# Patient Record
Sex: MALE | Race: OTHER | HISPANIC OR LATINO | URBAN - METROPOLITAN AREA
[De-identification: names, ages, dates, MRNs, and addresses within clinical notes are randomized per-mention and may not be internally consistent; named-entity substitution may affect disease eponyms.]

---

## 2023-10-26 ENCOUNTER — EMERGENCY (EMERGENCY)
Facility: HOSPITAL | Age: 53
LOS: 1 days | Discharge: HOME | End: 2023-10-26
Attending: EMERGENCY MEDICINE
Payer: COMMERCIAL

## 2023-10-26 VITALS
HEIGHT: 67 IN | SYSTOLIC BLOOD PRESSURE: 128 MMHG | TEMPERATURE: 98 F | HEART RATE: 78 BPM | OXYGEN SATURATION: 99 % | WEIGHT: 141.1 LBS | DIASTOLIC BLOOD PRESSURE: 87 MMHG | RESPIRATION RATE: 18 BRPM

## 2023-10-26 LAB
APPEARANCE UR: CLEAR — SIGNIFICANT CHANGE UP
APPEARANCE UR: CLEAR — SIGNIFICANT CHANGE UP
BILIRUB UR-MCNC: NEGATIVE — SIGNIFICANT CHANGE UP
BILIRUB UR-MCNC: NEGATIVE — SIGNIFICANT CHANGE UP
COLOR SPEC: YELLOW — SIGNIFICANT CHANGE UP
COLOR SPEC: YELLOW — SIGNIFICANT CHANGE UP
DIFF PNL FLD: ABNORMAL
DIFF PNL FLD: ABNORMAL
GLUCOSE UR QL: NEGATIVE MG/DL — SIGNIFICANT CHANGE UP
GLUCOSE UR QL: NEGATIVE MG/DL — SIGNIFICANT CHANGE UP
KETONES UR-MCNC: NEGATIVE — SIGNIFICANT CHANGE UP
KETONES UR-MCNC: NEGATIVE — SIGNIFICANT CHANGE UP
LEUKOCYTE ESTERASE UR-ACNC: NEGATIVE — SIGNIFICANT CHANGE UP
LEUKOCYTE ESTERASE UR-ACNC: NEGATIVE — SIGNIFICANT CHANGE UP
NITRITE UR-MCNC: NEGATIVE — SIGNIFICANT CHANGE UP
NITRITE UR-MCNC: NEGATIVE — SIGNIFICANT CHANGE UP
PH UR: 6.5 — SIGNIFICANT CHANGE UP (ref 5–8)
PH UR: 6.5 — SIGNIFICANT CHANGE UP (ref 5–8)
PROT UR-MCNC: NEGATIVE — SIGNIFICANT CHANGE UP
PROT UR-MCNC: NEGATIVE — SIGNIFICANT CHANGE UP
RBC CASTS # UR COMP ASSIST: SIGNIFICANT CHANGE UP /HPF (ref 0–4)
RBC CASTS # UR COMP ASSIST: SIGNIFICANT CHANGE UP /HPF (ref 0–4)
SP GR SPEC: 1.01 — SIGNIFICANT CHANGE UP (ref 1.01–1.02)
SP GR SPEC: 1.01 — SIGNIFICANT CHANGE UP (ref 1.01–1.02)
UROBILINOGEN FLD QL: NEGATIVE MG/DL — SIGNIFICANT CHANGE UP
UROBILINOGEN FLD QL: NEGATIVE MG/DL — SIGNIFICANT CHANGE UP
WBC UR QL: NEGATIVE /HPF — SIGNIFICANT CHANGE UP (ref 0–5)
WBC UR QL: NEGATIVE /HPF — SIGNIFICANT CHANGE UP (ref 0–5)

## 2023-10-26 PROCEDURE — 87086 URINE CULTURE/COLONY COUNT: CPT

## 2023-10-26 PROCEDURE — 99283 EMERGENCY DEPT VISIT LOW MDM: CPT

## 2023-10-26 PROCEDURE — T1013: CPT

## 2023-10-26 PROCEDURE — 99284 EMERGENCY DEPT VISIT MOD MDM: CPT

## 2023-10-26 PROCEDURE — 81001 URINALYSIS AUTO W/SCOPE: CPT

## 2023-10-26 RX ORDER — IBUPROFEN 200 MG
600 TABLET ORAL ONCE
Refills: 0 | Status: COMPLETED | OUTPATIENT
Start: 2023-10-26 | End: 2023-10-26

## 2023-10-26 RX ADMIN — Medication 600 MILLIGRAM(S): at 20:22

## 2023-10-26 NOTE — ED STATDOCS - CARE PROVIDER_API CALL
Helder Rutledge  Urology  [address] [place] [address]  Phone: [phone]  Fax: [phone]  Follow Up Time: Routine

## 2023-10-26 NOTE — ED ADULT TRIAGE NOTE - LOCATION:
Detail Level: Detailed Quality 110: Preventive Care And Screening: Influenza Immunization: Influenza Immunization not Administered for Documented Reasons. Left arm;

## 2023-10-26 NOTE — ED ADULT TRIAGE NOTE - CHIEF COMPLAINT QUOTE
Pt A&Ox4, NAD. Pt presents to the ED with complaints of painful urination. Pt seen by urology and put on abx. Breathing even and unlabored.

## 2023-10-26 NOTE — ED STATDOCS - CLINICAL SUMMARY MEDICAL DECISION MAKING FREE TEXT BOX
Pt presents with burning at end urination on antibiotics  for 2 weeks can't recall name. Pt with no fever, abdominal pain, nausea, or vomiting. Pt has follow up with Urologist Dr. Rutledge November 17.  Will check UA, urine culture, Motrin for pain. Follow up outpatient and continue  antibiotics Pt presents with burning at end urination on antibiotics  for 2 weeks can't recall name. Pt with no fever, abdominal pain, nausea, or vomiting. Pt has follow up with Urologist Dr. Rutledge November 17.  Will check UA, urine culture, Motrin for pain. Follow up outpatient and continue  antibiotics    UA showed blood. no other wbc in urine. no signes of UTI currently. supportive care. outpatient follow up with urology.

## 2023-10-26 NOTE — ED ADULT NURSE NOTE - OBJECTIVE STATEMENT
pt c/o urning  sensation after urination. Pt states he has not been feeling well when urinating.  Pt went to Urologist Dr. Dave Rutledge and was giving antibiotics  2 weeks ago

## 2023-10-26 NOTE — ED STATDOCS - PATIENT PORTAL LINK FT
You can access the FollowMyHealth Patient Portal offered by [facility] by registering at the following website: [email or web address]. By joining [employer]’s FollowMyHealth portal, you will also be able to view your health information using other applications (apps) compatible with our system.

## 2023-10-26 NOTE — ED STATDOCS - OBJECTIVE STATEMENT
VARINDER Martinez   54 y/o male no PMHx c/o burning  sensation after urination. Pt states he has not been feeling well when urinating.  Pt went to Urologist Dr. Dave Rutledge and was giving antibiotics  2 weeks ago . Pt denies fever, nausea, and vomiting. Pt has been on antibiotics for 2 weeks ago. Pt still has antibiotics , but doesn't know the name of antibiotics. Pt November 17 follow up. Check UA

## 2023-10-26 NOTE — ED STATDOCS - NS ED ROS FT
CONSTITUTIONAL - no  fever, no diaphoresis, no weight change  SKIN - no rash  HEMATOLOGIC - no bleeding, no bruising  EYES - no eye pain, no blurred vision  ENT - no change in hearing, no pain  RESPIRATORY - no shortness of breath, no cough  CARDIAC - no chest pain, no palpitations  GI - no abd pain, no nausea, no vomiting, no diarrhea, no constipation, no bleeding  GENITO-URINARY - + burning during urination no discharge, no dysuria; no hematuria,   ENDO - no polydipsia, no polyuria, no heat/no cold intolerance  MUSCULOSKELETAL - no joint pain, no swelling, no redness  NEUROLOGIC - no weakness, no headache, no anesthesia, no paresthesias  PSYCH - no anxiety, non suicidal, non homicidal, no hallucination, no depression

## 2023-10-27 LAB
CULTURE RESULTS: SIGNIFICANT CHANGE UP
CULTURE RESULTS: SIGNIFICANT CHANGE UP
SPECIMEN SOURCE: SIGNIFICANT CHANGE UP
SPECIMEN SOURCE: SIGNIFICANT CHANGE UP

## 2024-02-14 ENCOUNTER — EMERGENCY (EMERGENCY)
Facility: HOSPITAL | Age: 54
LOS: 1 days | Discharge: HOME | End: 2024-02-14
Attending: STUDENT IN AN ORGANIZED HEALTH CARE EDUCATION/TRAINING PROGRAM | Admitting: STUDENT IN AN ORGANIZED HEALTH CARE EDUCATION/TRAINING PROGRAM
Payer: MEDICAID

## 2024-02-14 VITALS
RESPIRATION RATE: 16 BRPM | HEART RATE: 70 BPM | SYSTOLIC BLOOD PRESSURE: 126 MMHG | DIASTOLIC BLOOD PRESSURE: 85 MMHG | OXYGEN SATURATION: 98 %

## 2024-02-14 VITALS
TEMPERATURE: 98 F | DIASTOLIC BLOOD PRESSURE: 97 MMHG | SYSTOLIC BLOOD PRESSURE: 137 MMHG | OXYGEN SATURATION: 97 % | RESPIRATION RATE: 16 BRPM | HEART RATE: 96 BPM

## 2024-02-14 PROBLEM — Z00.00 ENCOUNTER FOR PREVENTIVE HEALTH EXAMINATION: Status: ACTIVE | Noted: 2024-02-14

## 2024-02-14 LAB
ALBUMIN SERPL ELPH-MCNC: 4.1 G/DL — SIGNIFICANT CHANGE UP (ref 3.3–5.2)
ALP SERPL-CCNC: 55 U/L — SIGNIFICANT CHANGE UP (ref 40–120)
ALT FLD-CCNC: 24 U/L — SIGNIFICANT CHANGE UP
ANION GAP SERPL CALC-SCNC: 16 MMOL/L — SIGNIFICANT CHANGE UP (ref 5–17)
AST SERPL-CCNC: 22 U/L — SIGNIFICANT CHANGE UP
BILIRUB SERPL-MCNC: 0.4 MG/DL — SIGNIFICANT CHANGE UP (ref 0.4–2)
BUN SERPL-MCNC: 9.5 MG/DL — SIGNIFICANT CHANGE UP (ref 8–20)
CALCIUM SERPL-MCNC: 9.2 MG/DL — SIGNIFICANT CHANGE UP (ref 8.4–10.5)
CHLORIDE SERPL-SCNC: 102 MMOL/L — SIGNIFICANT CHANGE UP (ref 96–108)
CO2 SERPL-SCNC: 22 MMOL/L — SIGNIFICANT CHANGE UP (ref 22–29)
CREAT SERPL-MCNC: 1.1 MG/DL — SIGNIFICANT CHANGE UP (ref 0.5–1.3)
EGFR: 81 ML/MIN/1.73M2 — SIGNIFICANT CHANGE UP
GLUCOSE SERPL-MCNC: 112 MG/DL — HIGH (ref 70–99)
HCT VFR BLD CALC: 45 % — SIGNIFICANT CHANGE UP (ref 39–50)
HGB BLD-MCNC: 16 G/DL — SIGNIFICANT CHANGE UP (ref 13–17)
LIDOCAIN IGE QN: 43 U/L — SIGNIFICANT CHANGE UP (ref 22–51)
MCHC RBC-ENTMCNC: 32.4 PG — SIGNIFICANT CHANGE UP (ref 27–34)
MCHC RBC-ENTMCNC: 35.6 GM/DL — SIGNIFICANT CHANGE UP (ref 32–36)
MCV RBC AUTO: 91.1 FL — SIGNIFICANT CHANGE UP (ref 80–100)
PLATELET # BLD AUTO: 203 K/UL — SIGNIFICANT CHANGE UP (ref 150–400)
POTASSIUM SERPL-MCNC: 3.6 MMOL/L — SIGNIFICANT CHANGE UP (ref 3.5–5.3)
POTASSIUM SERPL-SCNC: 3.6 MMOL/L — SIGNIFICANT CHANGE UP (ref 3.5–5.3)
PROT SERPL-MCNC: 6.9 G/DL — SIGNIFICANT CHANGE UP (ref 6.6–8.7)
RBC # BLD: 4.94 M/UL — SIGNIFICANT CHANGE UP (ref 4.2–5.8)
RBC # FLD: 12.8 % — SIGNIFICANT CHANGE UP (ref 10.3–14.5)
SODIUM SERPL-SCNC: 140 MMOL/L — SIGNIFICANT CHANGE UP (ref 135–145)
WBC # BLD: 5.69 K/UL — SIGNIFICANT CHANGE UP (ref 3.8–10.5)
WBC # FLD AUTO: 5.69 K/UL — SIGNIFICANT CHANGE UP (ref 3.8–10.5)

## 2024-02-14 PROCEDURE — 99284 EMERGENCY DEPT VISIT MOD MDM: CPT

## 2024-02-14 PROCEDURE — 85027 COMPLETE CBC AUTOMATED: CPT

## 2024-02-14 PROCEDURE — 80053 COMPREHEN METABOLIC PANEL: CPT

## 2024-02-14 PROCEDURE — T1013: CPT

## 2024-02-14 PROCEDURE — 83690 ASSAY OF LIPASE: CPT

## 2024-02-14 PROCEDURE — 36415 COLL VENOUS BLD VENIPUNCTURE: CPT

## 2024-02-14 PROCEDURE — 99283 EMERGENCY DEPT VISIT LOW MDM: CPT

## 2024-02-14 RX ORDER — PANTOPRAZOLE SODIUM 20 MG/1
40 TABLET, DELAYED RELEASE ORAL ONCE
Refills: 0 | Status: COMPLETED | OUTPATIENT
Start: 2024-02-14 | End: 2024-02-14

## 2024-02-14 RX ORDER — SUCRALFATE 1 G
1 TABLET ORAL ONCE
Refills: 0 | Status: COMPLETED | OUTPATIENT
Start: 2024-02-14 | End: 2024-02-14

## 2024-02-14 RX ORDER — PANTOPRAZOLE SODIUM 20 MG/1
1 TABLET, DELAYED RELEASE ORAL
Qty: 28 | Refills: 0
Start: 2024-02-14 | End: 2024-03-12

## 2024-02-14 RX ORDER — SUCRALFATE 1 G
10 TABLET ORAL
Qty: 280 | Refills: 0
Start: 2024-02-14 | End: 2024-02-27

## 2024-02-14 RX ADMIN — Medication 1 GRAM(S): at 03:36

## 2024-02-14 RX ADMIN — PANTOPRAZOLE SODIUM 40 MILLIGRAM(S): 20 TABLET, DELAYED RELEASE ORAL at 03:36

## 2024-02-14 RX ADMIN — Medication 30 MILLILITER(S): at 03:35

## 2024-02-14 NOTE — ED PROVIDER NOTE - CLINICAL SUMMARY MEDICAL DECISION MAKING FREE TEXT BOX
53-year-old male past medical history of hypertension, hyperlipidemia, gastric reflux presenting for burning in his throat and occasional epigastric discomfort associated with cough. Will treat GERD and check hemoglobin and chemistry/lipase. 53-year-old male past medical history of hypertension, hyperlipidemia, gastric reflux presenting for burning in his throat and occasional epigastric discomfort associated with cough. Will treat GERD and check hemoglobin and chemistry/lipase.    Pt's GERD symptoms have greatly improved. Pt was PCP but will give [facility] clinic to ensure follow up.

## 2024-02-14 NOTE — ED PROVIDER NOTE - OBJECTIVE STATEMENT
53-year-old male past medical history of hypertension, hyperlipidemia, gastric reflux presenting for burning in his throat and occasional epigastric discomfort associated with cough.  Denying any nausea, vomiting, diarrhea, dysuria fever, chills.  Has been taking Pepcid from his PCP but no other antiacids. Denying black or bloody stool or vomit.

## 2024-02-14 NOTE — ED PROVIDER NOTE - NSFOLLOWUPINSTRUCTIONS_ED_ALL_ED_FT
Gemini un seguimiento con francisco proveedor de atención primaria o la clínica proporcionada.    [place] los medicamentos según lo recetado.    Enfermedad de reflujo gastroesofágico en los adultos  Gastroesophageal Reflux Disease, Adult    El reflujo gastroesofágico (RGE) ocurre cuando el ácido del estómago sube por el tubo que conecta la boca con el estómago (esófago). Normalmente, la comida baja por el esófago y se mantiene en el estómago, donde se la digiere. Sin embargo, cuando roberto persona tiene ERGE, los alimentos y el ácido estomacal suelen volver al esófago. Si esto se vuelve un problema más grave, a la persona se le puede diagnosticar roberto enfermedad llamada enfermedad de reflujo gastroesofágico (ERGE). La ERGE ocurre cuando el reflujo:  Sucede a menudo.  Causa síntomas frecuentes o graves.  Causa problemas tales verenice daño en el esófago.  Cuando el ácido del estómago entra en contacto con el esófago, el ácido puede provocar inflamación en el esófago. Con el tiempo, pueden formarse pequeños agujeros (úlceras) en el revestimiento del esófago.    ¿Cuáles son las causas?  Esta afección se debe a un problema en el músculo que se encuentra entre el esófago y el estómago (esfínter esofágico inferior, o EEI). Normalmente, el EEI se allen roberto vez que la comida pasa a través del esófago hasta el estómago. Cuando el EEI se encuentra debilitado o tiene alguna anomalía, no se allen por completo, y eso permite que tanto la comida verenice el jugo gástrico, que es ácido, vuelvan a subir por el esófago.    El EEI puede debilitarse a causa de ciertas sustancias alimenticias, medicamentos y afecciones, que incluyen:  El consumo de tabaco.  Embarazo.  Tener roberto hernia de hiato.  Consumo de alcohol.  Ciertos alimentos y bebidas, verenice café, chocolate, cebollas y menta.  ¿Qué incrementa el riesgo?  Es más probable que tenga esta afección si:  Tiene un aumento del peso corporal.  Tiene un trastorno del tejido conjuntivo.  Elizabeth antiinflamatorios no esteroideos (SHLOMO), verenice el ibuprofeno.  ¿Cuáles son los signos o síntomas?  Los síntomas de esta afección incluyen:  Acidez estomacal.  Dificultad o dolor para tragar o la sensación de tener un bulto en la garganta.  Sabor amargo en la boca.  Mal aliento y tener gran cantidad de saliva.  Estómago inflamado o con malestar y eructos.  Dolor en el pecho. El dolor de pecho puede deberse a distintas afecciones. Es importante que consulte al médico si tiene dolor de pecho.  Dificultad para respirar o sibilancias.  Tos murali (crónica) o tos nocturna.  Desgaste del esmalte dental.  Pérdida de peso.  ¿Cómo se diagnostica?  Esta afección se puede diagnosticar en función de los antecedentes médicos y un examen físico. Para determinar si tiene ERGE leve o grave, el médico también puede controlar cómo usted reacciona al tratamiento. También pueden hacerle estudios, que incluyen los siguientes:  Un estudio para examinarle el estómago y el esófago con roberto cámara pequeña (endoscopía).  Roberto prueba para medir el maryam de acidez en el esófago.  Roberto prueba para medir cuánta presión hay en el esófago.  Un estudio de deglución con bario común o modificado para constantine la forma, el tamaño y el funcionamiento del esófago.  ¿Cómo se trata?  El tratamiento de esta afección puede variar según la gravedad de los síntomas. El médico puede recomendarle lo siguiente:  Cambios en la dieta.  Medicamentos.  Cirugía.  El objetivo del tratamiento es ayudar a aliviar los síntomas y evitar las complicaciones.    Siga estas instrucciones en francisco casa:  Comida y bebida      Siga la dieta recomendada por el médico. [place] puede incluir evitar ciertos alimentos y bebidas, por ejemplo:  Café y té obinna, con o sin cafeína.  Bebidas que contengan alcohol.  Bebidas energéticas y deportivas.  Bebidas gaseosas o refrescos.  Chocolate y cacao.  Menta y esencia de menta.  [place] y cebolla.  Rábano picante.  Alimentos condimentados, picantes y ácidos, por ejemplo, todos los tipos de pimientas, chile en polvo, moore en polvo, vinagre, salsas picantes y salsa barbacoa.  Cítricos y jorgito jugos, por ejemplo, naranjas, jayjay y nicolle.  Alimentos a base de tomate, verenice salsa de tomate, chile, salsa picante y pizza con salsa de tomate.  Alimentos fritos y grasos, verenice donas, chris fritas y aderezos ricos en grasas.  Dominique con alto contenido de grasa, verenice salchichas, y hampton de dominique pacheco y nico con mucha grasa, por ejemplo, chuletas o costillas, embutidos, jamón y tocino.  Productos lácteos ricos en grasas, verenice leche entera, manteca y queso crema.  Gemini comidas pequeñas y frecuentes elia el día en lugar de comidas abundantes.  Evite beber grandes cantidades de líquidos con las comidas.  Evite comer 2 o 3 horas antes de acostarse.  Evite recostarse inmediatamente después de comer.  No gemini ejercicios enseguida después de comer.  Estilo de gabriel      No consuma ningún producto que contenga nicotina o tabaco. Estos productos incluyen cigarrillos, tabaco para mascar y aparatos de vapeo, verenice los cigarrillos electrónicos. Si necesita ayuda para dejar de fumar, consulte al médico.  Trate de reducir el estrés con métodos verenice el yoga o la meditación. Si necesita ayuda para reducir el nivel de estrés, consulte al médico.  Si tiene sobrepeso, baje hasta llegar a un peso saludable para usted. [place]ídale consejos al médico para bajar de peso de manera [place].  Instrucciones generales    Esté atento a cualquier cambio en los síntomas.  Use los medicamentos de venta daisy y los recetados solamente verenice se lo haya indicado el médico. No tome aspirina, ibuprofeno ni otros antiinflamatorios no esteroideos (SHLOMO) a menos que el médico le haya indicado que tome estos medicamentos.  Use ropa suelta. No use nada apretado alrededor de la cintura que gemini presión sobre el abdomen.  Levante (eleve) la cabecera de la cama aproximadamente 6 pulgadas (15 cm). Para hacerlo puede usar roberto cuña.  Evite inclinarse si al hacerlo empeoran los síntomas.  Cumpla con todas las visitas de seguimiento. [place] es importante.  Comuníquese con un médico si:  Tiene los siguientes síntomas:  Síntomas nuevos.  Pérdida de peso sin causa aparente.  Dificultad o dolor al tragar.  Sibilancias o roberto tos persistente.  Voz ronca.  Los síntomas no mejoran con el tratamiento.  Solicite ayuda de inmediato si:  Siente dolor repentino en los brazos, el hina, la mandíbula, los dientes o la espalda.  De repente se siente transpirado, mareado o aturdido.  Siente falta de aire o dolor en el pecho.  Vomita y el vómito es de color tej, amarillo o obinna, o tiene un aspecto similar a la mary o a los posos de café.  Se desmaya.  Tiene heces pacheco, sanguinolentas o negras.  No puede tragar, beber o comer.  Estos síntomas pueden representar un problema grave que constituye roberto emergencia. No espere a constantine si los síntomas desaparecen. Solicite atención médica de inmediato. Comuníquese con el servicio de emergencias de francisco localidad (911 en los [place]). No conduzca por jorgito propios medios hasta el hospital.    Resumen  El reflujo gastroesofágico ocurre cuando el ácido del estómago sube al esófago. La ERGE es roberto enfermedad en la que el reflujo ocurre con frecuencia, causa síntomas frecuentes o graves, o causa problemas tales verenice daño en el esófago.  El tratamiento de esta afección puede variar según la gravedad de los síntomas. El médico puede indicarle que siga roberto dieta y gemini cambios en francisco estilo de gabriel, tome medicamentos o se someta a roberto cirugía.  Comuníquese con un médico si tiene síntomas nuevos o los síntomas empeoran.  Use los medicamentos de venta daisy y los recetados solamente verenice se lo haya indicado el médico. No tome aspirina, ibuprofeno ni otros antiinflamatorios no esteroideos (SHLOMO) a menos que el médico se lo indique.  Concurra a todas las visitas de seguimiento verenice se lo haya indicado el médico. [place] es importante.  Esta información no tiene verenice fin reemplazar el consejo del médico. Asegúrese de hacerle al médico cualquier pregunta que tenga.

## 2024-02-14 NOTE — ED PROVIDER NOTE - ATTENDING CONTRIBUTION TO CARE
53y M w/ hx HTN, HLD, GERD, presents for epigastric pain. Pt reports prior history of similar symptoms and was diagnosed with GERD, for which he was prescribed famotidine. Pt reports having increased symptoms over the past few days. Took Tums prior to arrival without relief. Denies fever, vomiting, diarrhea, black/bloody stools. On exam, pt well appearing and in no distress. Abdomen soft and nontender throughout. Stable VS. Will treat symptomatically, check labs, reassess.

## 2024-02-14 NOTE — ED ADULT NURSE NOTE - OBJECTIVE STATEMENT
pt is 53y male presenting to ED for abdominal discomfort. Pt states he has hx of gastritis and "feels like I have too much acid in my stomach". Pt endorses epigastric discomfort however denies any severe pain. Pt denies any bloody stools, vomiting, diarrhea, headaches, SOB or weakness. Pt is a&ox3 in no acute distress, resp even and unlabored. Pt safety provided.

## 2024-02-14 NOTE — ED ADULT TRIAGE NOTE - CHIEF COMPLAINT QUOTE
Pt BIBA from home c/o acid reflux, seen by PCP on Monday, was referred to specialist but has not followed up.  Pt c/o lower abdominal pain and nausea.  hx of HTN

## 2024-02-14 NOTE — ED PROVIDER NOTE - PATIENT PORTAL LINK FT
You can access the FollowMyHealth Patient Portal offered by [facility] by registering at the following website: [email or web address]. By joining [employer]’s FollowMyHealth portal, you will also be able to view your health information using other applications (apps) compatible with our system.

## 2024-02-15 ENCOUNTER — APPOINTMENT (OUTPATIENT)
Dept: ORTHOPEDIC SURGERY | Facility: CLINIC | Age: 54
End: 2024-02-15
Payer: COMMERCIAL

## 2024-02-15 VITALS — BODY MASS INDEX: 21.97 KG/M2 | WEIGHT: 140 LBS | HEIGHT: 67 IN

## 2024-02-15 DIAGNOSIS — M54.50 LOW BACK PAIN, UNSPECIFIED: ICD-10-CM

## 2024-02-15 PROCEDURE — 99203 OFFICE O/P NEW LOW 30 MIN: CPT

## 2024-02-15 RX ORDER — ATORVASTATIN CALCIUM 80 MG/1
TABLET, FILM COATED ORAL
Refills: 0 | Status: ACTIVE | COMMUNITY

## 2024-02-15 RX ORDER — METHOCARBAMOL 750 MG/1
750 TABLET, FILM COATED ORAL
Qty: 30 | Refills: 0 | Status: ACTIVE | COMMUNITY
Start: 2024-02-15 | End: 1900-01-01

## 2024-02-15 RX ORDER — DICLOFENAC SODIUM 75 MG/1
75 TABLET, DELAYED RELEASE ORAL
Qty: 60 | Refills: 2 | Status: ACTIVE | COMMUNITY
Start: 2024-02-15 | End: 1900-01-01

## 2024-02-15 NOTE — DATA REVIEWED
[Outside X-rays] : outside x-rays [Lumbar Spine] : lumbar spine [I reviewed the films/CD and agree] : I reviewed the films/CD and agree [FreeTextEntry1] : minimal retrolisthesis

## 2024-02-15 NOTE — HISTORY OF PRESENT ILLNESS
[Lower back] : lower back [7] : 7 [5] : 5 [Dull/Aching] : dull/aching [Sharp] : sharp [Intermittent] : intermittent [Nothing helps with pain getting better] : Nothing helps with pain getting better [de-identified] : Has left low back pain past couple of weeks. No injury. No pain down legs, no bowel or bladder issues. Not working at present [] : no

## 2024-02-19 ENCOUNTER — EMERGENCY (EMERGENCY)
Facility: HOSPITAL | Age: 54
LOS: 1 days | Discharge: HOME | End: 2024-02-19
Attending: EMERGENCY MEDICINE
Payer: MEDICAID

## 2024-02-19 VITALS
OXYGEN SATURATION: 98 % | TEMPERATURE: 98 F | DIASTOLIC BLOOD PRESSURE: 95 MMHG | HEIGHT: 67 IN | SYSTOLIC BLOOD PRESSURE: 134 MMHG | WEIGHT: 139.99 LBS | RESPIRATION RATE: 18 BRPM | HEART RATE: 86 BPM

## 2024-02-19 PROCEDURE — 99283 EMERGENCY DEPT VISIT LOW MDM: CPT

## 2024-02-19 PROCEDURE — 99284 EMERGENCY DEPT VISIT MOD MDM: CPT

## 2024-02-19 PROCEDURE — T1013: CPT

## 2024-02-19 RX ORDER — FAMOTIDINE 10 MG/ML
20 INJECTION INTRAVENOUS DAILY
Refills: 0 | Status: DISCONTINUED | OUTPATIENT
Start: 2024-02-19 | End: 2024-02-26

## 2024-02-19 RX ORDER — METOCLOPRAMIDE HCL 10 MG
1 TABLET ORAL
Qty: 10 | Refills: 0
Start: 2024-02-19 | End: 2024-02-28

## 2024-02-19 RX ORDER — FAMOTIDINE 10 MG/ML
1 INJECTION INTRAVENOUS
Qty: 10 | Refills: 0
Start: 2024-02-19 | End: 2024-02-28

## 2024-02-19 RX ADMIN — Medication 30 MILLILITER(S): at 13:54

## 2024-02-19 RX ADMIN — FAMOTIDINE 20 MILLIGRAM(S): 10 INJECTION INTRAVENOUS at 13:55

## 2024-02-19 NOTE — ED ADULT NURSE NOTE - CHIEF COMPLAINT QUOTE
pt c/o "acid reflux" x2 weeks pt has been following up with GI MD and had endoscopy with negative results

## 2024-02-19 NOTE — ED ADULT TRIAGE NOTE - CHIEF COMPLAINT QUOTE
pt c/o "acid reflux" x2 weeks pt had endoscopy with negative results pt c/o "acid reflux" x2 weeks pt has been following up with GI MD and had endoscopy with negative results

## 2024-02-19 NOTE — ED PROVIDER NOTE - ATTENDING APP SHARED VISIT CONTRIBUTION OF CARE
Priyanka NOLANLB-43-bpyd-old male presents with increased abdominal gas and left upper quadrant pain.  Patient has known history of GERD and was started on Pepcid after an endoscopy.  Patient has no nausea no vomiting no diarrhea no weight loss    Patient alert well-appearing male, S1-S2 regular, bilateral clear breath sounds, abdomen soft nontender nondistended, neuroexam is alert oriented x 3 no focal deficits, skin warm dry good turgor    Plan to increase the dose of Pepcid to 40 and add Maalox and provide follow-up with GI

## 2024-02-19 NOTE — ED ADULT NURSE NOTE - OBJECTIVE STATEMENT
Pt a&ox3, in ED for abd pain, and acid reflux, no N/V/D. No SOB, unlabored breathing, equal rise & fall, able to speak in full sentences, denies CP. PMH of cholesterol and gastritis

## 2024-02-19 NOTE — ED ADULT NURSE NOTE - NSFALLUNIVINTERV_ED_ALL_ED
Bed/Stretcher in lowest position, wheels locked, appropriate side rails in place/Call bell, personal items and telephone in reach/Instruct patient to call for assistance before getting out of bed/chair/stretcher/Non-slip footwear applied when patient is off stretcher/[place] to call system/Physically safe environment - no spills, clutter or unnecessary equipment/Purposeful proactive rounding/Room/bathroom lighting operational, light cord in reach

## 2024-02-19 NOTE — ED ADULT NURSE NOTE - VOIDING
Health  Consult Note    Name: Maxx Castro  Clinic Number: [id number]  Physician: No ref. provider found  Past Medical History:   Diagnosis Date    Arthritis     degenerative changes lower back knees and spine    Asthma     Back pain     Cataract     Cataract     Cyst, dermoid, mouth     mucus dermoid, malignant    Glaucoma     Glaucoma     Hyperlipemia     Hypertension     Obesity     Peripheral vascular disease     SCCA (squamous cell carcinoma) of skin     established with dermatology    Sciatica     Sleep apnea     Spinal cord disease     Spinal stenosis     Trouble in sleeping      Time In:   Time Out:    Health  Agreement signed: ***    Coaching performed performed: ***    Subjective:   Patient reports ***    Vision:  ***    Values***    Strengths:  ***    Challenges:  ***    Support:  ***    Hobbies:  ***    Objective:  Maxx was instructed ***      INITIAL date***  One a scale of 1-10, with 10 being 100% happy, how would you rate your happiness in each of the wellness areas below?    Happiness:         1     2     3     4     5     6     7     8     9     10    Initial Date: DC Date: +/- Total Change   Exercise/Movement ***     Physical Health ***     Stress Level ***     Nutrition ***     Sleep ***     Play ***     Body Image ***     Relationships ***     Energy/Vitality ***       Assessment:   Patient ***    Plan:  Patient goals for next consult include ***    Health : Dirk Pal  3/9/2023   without difficulty

## 2024-02-19 NOTE — ED PROVIDER NOTE - PROGRESS NOTE DETAILS
on reevaluation patient states he feels a lot better.  Patient denies any burning sensation, to his throat.  Patient DC with Rx sent to patient pharmacy.

## 2024-02-19 NOTE — ED PROVIDER NOTE - CLINICAL SUMMARY MEDICAL DECISION MAKING FREE TEXT BOX
53-year-old male presents to ED complaint of abdominal pain and  burning sensation from his belly coming towards his chest.  Patient explained that he knows that he has a lot of reflux going on with the burning sensation.  Patient states that he is on famotidine 20 mg once a day.  Patient said he had a endoscopy x 1 month ago that shows some irregular so changes but no ulceration.  Patient stated he feels like the medication is not effective enough so he came in today to be evaluated.   HEENT: Normal findings, Eyes : PERRLA, EOMI , Nares cler and Throat : WNL  Lungs: Clear B/L with good air entry  CVS: S1-S2 , with no murmurs  Abd : Normal BS, with no tenderness or organomegaly  Ext: Normal findings   patient treated with Maalox, Pepcid he and Reglan in ED.  Patient DC with Rx sent to patient pharmacy.

## 2024-02-19 NOTE — ED PROVIDER NOTE - OBJECTIVE STATEMENT
53-year-old male presents to ED complaint of abdominal pain and  burning sensation from his belly coming towards his chest.  Patient explained that he knows that he has a lot of reflux going on with the burning sensation.  Patient states that he is on famotidine 20 mg once a day.  Patient said he had a endoscopy x 1 month ago that shows some irregular so changes but no ulceration.  Patient stated he feels like the medication is not effective enough so he came in today to be evaluated.  Patient denies any chest pain, shortness of breath, difficulty breathing, abdominal pain, vomiting, nausea.  Patient is also explained that he had an upcoming GI appointment next month.

## 2024-02-19 NOTE — ED PROVIDER NOTE - PATIENT PORTAL LINK FT
You can access the FollowMyHealth Patient Portal offered by [facility] by registering at the following website: [email or web address]. By joining [employer]’s FollowMyHealth portal, you will also be able to view your health information using other applications (apps) compatible with our system.

## 2024-02-27 ENCOUNTER — EMERGENCY (EMERGENCY)
Facility: HOSPITAL | Age: 54
LOS: 1 days | End: 2024-02-27
Attending: EMERGENCY MEDICINE
Payer: MEDICAID

## 2024-02-27 VITALS
HEART RATE: 108 BPM | TEMPERATURE: 99 F | HEIGHT: 67 IN | RESPIRATION RATE: 22 BRPM | SYSTOLIC BLOOD PRESSURE: 178 MMHG | WEIGHT: 139.99 LBS | OXYGEN SATURATION: 97 % | DIASTOLIC BLOOD PRESSURE: 109 MMHG

## 2024-02-27 VITALS — HEART RATE: 75 BPM | DIASTOLIC BLOOD PRESSURE: 89 MMHG | SYSTOLIC BLOOD PRESSURE: 140 MMHG

## 2024-02-27 LAB
ALBUMIN SERPL ELPH-MCNC: 4.3 G/DL — SIGNIFICANT CHANGE UP (ref 3.3–5.2)
ALP SERPL-CCNC: 59 U/L — SIGNIFICANT CHANGE UP (ref 40–120)
ALT FLD-CCNC: 35 U/L — SIGNIFICANT CHANGE UP
ANION GAP SERPL CALC-SCNC: 12 MMOL/L — SIGNIFICANT CHANGE UP (ref 5–17)
APTT BLD: 32.4 SEC — SIGNIFICANT CHANGE UP (ref 24.5–35.6)
AST SERPL-CCNC: 24 U/L — SIGNIFICANT CHANGE UP
BASOPHILS # BLD AUTO: 0.03 K/UL — SIGNIFICANT CHANGE UP (ref 0–0.2)
BASOPHILS NFR BLD AUTO: 0.4 % — SIGNIFICANT CHANGE UP (ref 0–2)
BILIRUB SERPL-MCNC: 0.6 MG/DL — SIGNIFICANT CHANGE UP (ref 0.4–2)
BUN SERPL-MCNC: 8.4 MG/DL — SIGNIFICANT CHANGE UP (ref 8–20)
CALCIUM SERPL-MCNC: 9.4 MG/DL — SIGNIFICANT CHANGE UP (ref 8.4–10.5)
CHLORIDE SERPL-SCNC: 100 MMOL/L — SIGNIFICANT CHANGE UP (ref 96–108)
CO2 SERPL-SCNC: 26 MMOL/L — SIGNIFICANT CHANGE UP (ref 22–29)
COHGB MFR BLDV: 2.5 % — SIGNIFICANT CHANGE UP
CREAT SERPL-MCNC: 1.01 MG/DL — SIGNIFICANT CHANGE UP (ref 0.5–1.3)
EGFR: 90 ML/MIN/1.73M2 — SIGNIFICANT CHANGE UP
EOSINOPHIL # BLD AUTO: 0.02 K/UL — SIGNIFICANT CHANGE UP (ref 0–0.5)
EOSINOPHIL NFR BLD AUTO: 0.3 % — SIGNIFICANT CHANGE UP (ref 0–6)
ETHANOL SERPL-MCNC: <10 MG/DL — SIGNIFICANT CHANGE UP (ref 0–9)
FLUAV AG NPH QL: SIGNIFICANT CHANGE UP
FLUBV AG NPH QL: SIGNIFICANT CHANGE UP
GLUCOSE SERPL-MCNC: 114 MG/DL — HIGH (ref 70–99)
HCT VFR BLD CALC: 45.6 % — SIGNIFICANT CHANGE UP (ref 39–50)
HGB BLD CALC-MCNC: 16.5 G/DL — SIGNIFICANT CHANGE UP (ref 12.6–17.4)
HGB BLD-MCNC: 16 G/DL — SIGNIFICANT CHANGE UP (ref 13–17)
IMM GRANULOCYTES NFR BLD AUTO: 0.3 % — SIGNIFICANT CHANGE UP (ref 0–0.9)
INR BLD: 1.05 RATIO — SIGNIFICANT CHANGE UP (ref 0.85–1.18)
LYMPHOCYTES # BLD AUTO: 1.56 K/UL — SIGNIFICANT CHANGE UP (ref 1–3.3)
LYMPHOCYTES # BLD AUTO: 21.8 % — SIGNIFICANT CHANGE UP (ref 13–44)
MAGNESIUM SERPL-MCNC: 2.3 MG/DL — SIGNIFICANT CHANGE UP (ref 1.6–2.6)
MCHC RBC-ENTMCNC: 31.5 PG — SIGNIFICANT CHANGE UP (ref 27–34)
MCHC RBC-ENTMCNC: 35.1 GM/DL — SIGNIFICANT CHANGE UP (ref 32–36)
MCV RBC AUTO: 89.8 FL — SIGNIFICANT CHANGE UP (ref 80–100)
MONOCYTES # BLD AUTO: 0.7 K/UL — SIGNIFICANT CHANGE UP (ref 0–0.9)
MONOCYTES NFR BLD AUTO: 9.8 % — SIGNIFICANT CHANGE UP (ref 2–14)
NEUTROPHILS # BLD AUTO: 4.82 K/UL — SIGNIFICANT CHANGE UP (ref 1.8–7.4)
NEUTROPHILS NFR BLD AUTO: 67.4 % — SIGNIFICANT CHANGE UP (ref 43–77)
NT-PROBNP SERPL-SCNC: 41 PG/ML — SIGNIFICANT CHANGE UP (ref 0–300)
PLATELET # BLD AUTO: 239 K/UL — SIGNIFICANT CHANGE UP (ref 150–400)
POTASSIUM SERPL-MCNC: 3.8 MMOL/L — SIGNIFICANT CHANGE UP (ref 3.5–5.3)
POTASSIUM SERPL-SCNC: 3.8 MMOL/L — SIGNIFICANT CHANGE UP (ref 3.5–5.3)
PROT SERPL-MCNC: 7.4 G/DL — SIGNIFICANT CHANGE UP (ref 6.6–8.7)
PROTHROM AB SERPL-ACNC: 11.6 SEC — SIGNIFICANT CHANGE UP (ref 9.5–13)
RBC # BLD: 5.08 M/UL — SIGNIFICANT CHANGE UP (ref 4.2–5.8)
RBC # FLD: 13.1 % — SIGNIFICANT CHANGE UP (ref 10.3–14.5)
RSV RNA NPH QL NAA+NON-PROBE: SIGNIFICANT CHANGE UP
SARS-COV-2 RNA SPEC QL NAA+PROBE: SIGNIFICANT CHANGE UP
SODIUM SERPL-SCNC: 138 MMOL/L — SIGNIFICANT CHANGE UP (ref 135–145)
TROPONIN T, HIGH SENSITIVITY RESULT: 9 NG/L — SIGNIFICANT CHANGE UP (ref 0–51)
WBC # BLD: 7.15 K/UL — SIGNIFICANT CHANGE UP (ref 3.8–10.5)
WBC # FLD AUTO: 7.15 K/UL — SIGNIFICANT CHANGE UP (ref 3.8–10.5)

## 2024-02-27 PROCEDURE — 99285 EMERGENCY DEPT VISIT HI MDM: CPT | Mod: 25

## 2024-02-27 PROCEDURE — 80053 COMPREHEN METABOLIC PANEL: CPT

## 2024-02-27 PROCEDURE — 99285 EMERGENCY DEPT VISIT HI MDM: CPT

## 2024-02-27 PROCEDURE — 85025 COMPLETE CBC W/AUTO DIFF WBC: CPT

## 2024-02-27 PROCEDURE — 82375 ASSAY CARBOXYHB QUANT: CPT

## 2024-02-27 PROCEDURE — T1013: CPT

## 2024-02-27 PROCEDURE — 93005 ELECTROCARDIOGRAM TRACING: CPT

## 2024-02-27 PROCEDURE — 85730 THROMBOPLASTIN TIME PARTIAL: CPT

## 2024-02-27 PROCEDURE — 71045 X-RAY EXAM CHEST 1 VIEW: CPT | Mod: 26

## 2024-02-27 PROCEDURE — 85610 PROTHROMBIN TIME: CPT

## 2024-02-27 PROCEDURE — 83735 ASSAY OF MAGNESIUM: CPT

## 2024-02-27 PROCEDURE — 93010 ELECTROCARDIOGRAM REPORT: CPT

## 2024-02-27 PROCEDURE — 71045 X-RAY EXAM CHEST 1 VIEW: CPT

## 2024-02-27 PROCEDURE — 87637 SARSCOV2&INF A&B&RSV AMP PRB: CPT

## 2024-02-27 PROCEDURE — 83880 ASSAY OF NATRIURETIC PEPTIDE: CPT

## 2024-02-27 PROCEDURE — 84484 ASSAY OF TROPONIN QUANT: CPT

## 2024-02-27 PROCEDURE — 36415 COLL VENOUS BLD VENIPUNCTURE: CPT

## 2024-02-27 PROCEDURE — 80307 DRUG TEST PRSMV CHEM ANLYZR: CPT

## 2024-02-27 RX ORDER — LIDOCAINE 4 G/100G
1 CREAM TOPICAL ONCE
Refills: 0 | Status: COMPLETED | OUTPATIENT
Start: 2024-02-27 | End: 2024-02-27

## 2024-02-27 RX ADMIN — LIDOCAINE 1 PATCH: 4 CREAM TOPICAL at 18:36

## 2024-02-27 NOTE — ED ADULT NURSE NOTE - OBJECTIVE STATEMENT
Patient presented tot he ED with c/o SOB.  Riccardo at bedside. Patient stated he was driving and was exposed to gas from the heater, has been experiencing SOB since 2 days which has exacerbated. c/o blurry vision and nausea. Denies headache, vomiting chest pain, dizziness or anticoagulation medication use.  and cardiac monitor in place, unlabored even breathing noted b/l on room air. Vitals as charted.

## 2024-02-27 NOTE — ED PROVIDER NOTE - PATIENT PORTAL LINK FT
You can access the FollowMyHealth Patient Portal offered by [facility] by registering at the following website: [email or web address]. By joining [employer]’s FollowMyHealth portal, you will also be able to view your health information using other applications (apps) compatible with our system.

## 2024-02-27 NOTE — ED ADULT NURSE NOTE - NSFALLUNIVINTERV_ED_ALL_ED
Bed/Stretcher in lowest position, wheels locked, appropriate side rails in place/Call bell, personal items and telephone in reach/Instruct patient to call for assistance before getting out of bed/chair/stretcher/Non-slip footwear applied when patient is off stretcher/[place] to call system/Physically safe environment - no spills, clutter or unnecessary equipment/Purposeful proactive rounding/Room/bathroom lighting operational, light cord in reach

## 2024-02-27 NOTE — ED PROVIDER NOTE - CLINICAL SUMMARY MEDICAL DECISION MAKING FREE TEXT BOX
53 yoM; with PMH significant for HTN, HLD, GERD; now presenting with shortness of breath. will do labs, xray, swab, re-eval 53 yoM; with PMH significant for HTN, HLD, GERD; now presenting with shortness of breath. will do labs, xray, swab, re-eval.  Labs within normal limits.  Chest x-ray normal.  Patient reports feeling better after being removed from his vehicle.  Desires discharge and agrees to follow-up.

## 2024-02-27 NOTE — ED PROVIDER NOTE - PHYSICAL EXAMINATION
General:     NAD, drowsy  Head:     NC/AT, EOMI, oral mucosa moist  Neck:     trachea midline  Lungs:     CTA b/l, no w/r/r  CVS:     S1S2, RRR, no m/g/r  Abd:     +BS, s/nt/nd, no organomegaly  Ext:    2+ radial and pedal pulses, no c/c/e  Neuro: AAOx3, no sensory/motor deficits

## 2024-02-27 NOTE — ED PROVIDER NOTE - OBJECTIVE STATEMENT
53 yoM; with PMH significant for HTN, HLD, GERD; now presenting with shortness of breath.  Patient states he was in his car and turned on the heat and had sudden puff of air come out that caused him to have cough and shortness of breath.  Patient felt that the air blowing out of the heater was contaminated with dust particles.  Denies any chest pain or palpitations.  Denies fever, chills, sweats.  Denies numbness or tingling.  Denies dizziness.

## 2024-02-27 NOTE — ED PROVIDER NOTE - CARE PROVIDER_API CALL
[facility]  [address] [place] [address], [place] [address]  Phone: [phone]  Fax: [phone]  Follow Up Time: 1-3 Days

## 2024-02-28 ENCOUNTER — APPOINTMENT (OUTPATIENT)
Dept: GASTROENTEROLOGY | Facility: CLINIC | Age: 54
End: 2024-02-28

## 2024-02-29 ENCOUNTER — EMERGENCY (EMERGENCY)
Facility: HOSPITAL | Age: 54
LOS: 1 days | Discharge: HOME | End: 2024-02-29
Attending: STUDENT IN AN ORGANIZED HEALTH CARE EDUCATION/TRAINING PROGRAM
Payer: COMMERCIAL

## 2024-02-29 VITALS
DIASTOLIC BLOOD PRESSURE: 112 MMHG | RESPIRATION RATE: 20 BRPM | SYSTOLIC BLOOD PRESSURE: 171 MMHG | OXYGEN SATURATION: 97 % | HEART RATE: 136 BPM

## 2024-02-29 VITALS
DIASTOLIC BLOOD PRESSURE: 87 MMHG | HEART RATE: 105 BPM | RESPIRATION RATE: 16 BRPM | HEIGHT: 67 IN | OXYGEN SATURATION: 99 % | TEMPERATURE: 97 F | SYSTOLIC BLOOD PRESSURE: 142 MMHG

## 2024-02-29 DIAGNOSIS — F29 UNSPECIFIED PSYCHOSIS NOT DUE TO A SUBSTANCE OR KNOWN PHYSIOLOGICAL CONDITION: ICD-10-CM

## 2024-02-29 DIAGNOSIS — F20.9 SCHIZOPHRENIA, UNSPECIFIED: ICD-10-CM

## 2024-02-29 LAB
ALBUMIN SERPL ELPH-MCNC: 4.3 G/DL — SIGNIFICANT CHANGE UP (ref 3.3–5.2)
ALP SERPL-CCNC: 62 U/L — SIGNIFICANT CHANGE UP (ref 40–120)
ALT FLD-CCNC: 46 U/L — HIGH
ANION GAP SERPL CALC-SCNC: 12 MMOL/L — SIGNIFICANT CHANGE UP (ref 5–17)
APAP SERPL-MCNC: <3 UG/ML — LOW (ref 10–26)
AST SERPL-CCNC: 32 U/L — SIGNIFICANT CHANGE UP
BASOPHILS # BLD AUTO: 0.04 K/UL — SIGNIFICANT CHANGE UP (ref 0–0.2)
BASOPHILS NFR BLD AUTO: 0.4 % — SIGNIFICANT CHANGE UP (ref 0–2)
BILIRUB SERPL-MCNC: 0.7 MG/DL — SIGNIFICANT CHANGE UP (ref 0.4–2)
BUN SERPL-MCNC: 15.3 MG/DL — SIGNIFICANT CHANGE UP (ref 8–20)
CALCIUM SERPL-MCNC: 9.4 MG/DL — SIGNIFICANT CHANGE UP (ref 8.4–10.5)
CHLORIDE SERPL-SCNC: 100 MMOL/L — SIGNIFICANT CHANGE UP (ref 96–108)
CHOLEST SERPL-MCNC: 196 MG/DL — SIGNIFICANT CHANGE UP
CO2 SERPL-SCNC: 27 MMOL/L — SIGNIFICANT CHANGE UP (ref 22–29)
CREAT SERPL-MCNC: 1.06 MG/DL — SIGNIFICANT CHANGE UP (ref 0.5–1.3)
EGFR: 85 ML/MIN/1.73M2 — SIGNIFICANT CHANGE UP
EOSINOPHIL # BLD AUTO: 0.02 K/UL — SIGNIFICANT CHANGE UP (ref 0–0.5)
EOSINOPHIL NFR BLD AUTO: 0.2 % — SIGNIFICANT CHANGE UP (ref 0–6)
ETHANOL SERPL-MCNC: <10 MG/DL — SIGNIFICANT CHANGE UP (ref 0–9)
FLUAV AG NPH QL: SIGNIFICANT CHANGE UP
FLUBV AG NPH QL: SIGNIFICANT CHANGE UP
GLUCOSE SERPL-MCNC: 110 MG/DL — HIGH (ref 70–99)
HCT VFR BLD CALC: 46.7 % — SIGNIFICANT CHANGE UP (ref 39–50)
HDLC SERPL-MCNC: 59 MG/DL — SIGNIFICANT CHANGE UP
HGB BLD-MCNC: 16.4 G/DL — SIGNIFICANT CHANGE UP (ref 13–17)
IMM GRANULOCYTES NFR BLD AUTO: 0.4 % — SIGNIFICANT CHANGE UP (ref 0–0.9)
LIPID PNL WITH DIRECT LDL SERPL: 126 MG/DL — HIGH
LYMPHOCYTES # BLD AUTO: 1.57 K/UL — SIGNIFICANT CHANGE UP (ref 1–3.3)
LYMPHOCYTES # BLD AUTO: 17.1 % — SIGNIFICANT CHANGE UP (ref 13–44)
MCHC RBC-ENTMCNC: 32.3 PG — SIGNIFICANT CHANGE UP (ref 27–34)
MCHC RBC-ENTMCNC: 35.1 GM/DL — SIGNIFICANT CHANGE UP (ref 32–36)
MCV RBC AUTO: 92.1 FL — SIGNIFICANT CHANGE UP (ref 80–100)
MONOCYTES # BLD AUTO: 0.88 K/UL — SIGNIFICANT CHANGE UP (ref 0–0.9)
MONOCYTES NFR BLD AUTO: 9.6 % — SIGNIFICANT CHANGE UP (ref 2–14)
NEUTROPHILS # BLD AUTO: 6.61 K/UL — SIGNIFICANT CHANGE UP (ref 1.8–7.4)
NEUTROPHILS NFR BLD AUTO: 72.3 % — SIGNIFICANT CHANGE UP (ref 43–77)
NON HDL CHOLESTEROL: 137 MG/DL — HIGH
PLATELET # BLD AUTO: 228 K/UL — SIGNIFICANT CHANGE UP (ref 150–400)
POTASSIUM SERPL-MCNC: 3.9 MMOL/L — SIGNIFICANT CHANGE UP (ref 3.5–5.3)
POTASSIUM SERPL-SCNC: 3.9 MMOL/L — SIGNIFICANT CHANGE UP (ref 3.5–5.3)
PROT SERPL-MCNC: 7.4 G/DL — SIGNIFICANT CHANGE UP (ref 6.6–8.7)
RBC # BLD: 5.07 M/UL — SIGNIFICANT CHANGE UP (ref 4.2–5.8)
RBC # FLD: 13.4 % — SIGNIFICANT CHANGE UP (ref 10.3–14.5)
RSV RNA NPH QL NAA+NON-PROBE: SIGNIFICANT CHANGE UP
SALICYLATES SERPL-MCNC: <0.6 MG/DL — LOW (ref 10–20)
SARS-COV-2 RNA SPEC QL NAA+PROBE: SIGNIFICANT CHANGE UP
SODIUM SERPL-SCNC: 139 MMOL/L — SIGNIFICANT CHANGE UP (ref 135–145)
TRIGL SERPL-MCNC: 53 MG/DL — SIGNIFICANT CHANGE UP
WBC # BLD: 9.16 K/UL — SIGNIFICANT CHANGE UP (ref 3.8–10.5)
WBC # FLD AUTO: 9.16 K/UL — SIGNIFICANT CHANGE UP (ref 3.8–10.5)

## 2024-02-29 PROCEDURE — 36415 COLL VENOUS BLD VENIPUNCTURE: CPT

## 2024-02-29 PROCEDURE — 80053 COMPREHEN METABOLIC PANEL: CPT

## 2024-02-29 PROCEDURE — 70450 CT HEAD/BRAIN W/O DYE: CPT | Mod: MC

## 2024-02-29 PROCEDURE — 87637 SARSCOV2&INF A&B&RSV AMP PRB: CPT

## 2024-02-29 PROCEDURE — T1013: CPT

## 2024-02-29 PROCEDURE — 99285 EMERGENCY DEPT VISIT HI MDM: CPT

## 2024-02-29 PROCEDURE — 84443 ASSAY THYROID STIM HORMONE: CPT

## 2024-02-29 PROCEDURE — 93005 ELECTROCARDIOGRAM TRACING: CPT

## 2024-02-29 PROCEDURE — 93010 ELECTROCARDIOGRAM REPORT: CPT

## 2024-02-29 PROCEDURE — 80307 DRUG TEST PRSMV CHEM ANLYZR: CPT

## 2024-02-29 PROCEDURE — 99285 EMERGENCY DEPT VISIT HI MDM: CPT | Mod: 25

## 2024-02-29 PROCEDURE — 85025 COMPLETE CBC W/AUTO DIFF WBC: CPT

## 2024-02-29 PROCEDURE — 70450 CT HEAD/BRAIN W/O DYE: CPT | Mod: 26,MC

## 2024-02-29 PROCEDURE — 80061 LIPID PANEL: CPT

## 2024-02-29 NOTE — CHART NOTE - NSCHARTNOTEFT_GEN_A_CORE
LINA Note: Earlier  was notified by  that the pt was cleared for D/C home with no f/u needed. Met with pt along with an  and pt was not able to recall personal info and appeared thought blocked. Notified  provider ( Dr. Wilson ) ED provider and RN. SW is holding D/C for additional provider workup. Reviewed an old chart from 2016 and provider  provider with family phone listed: Alex Awan [phone]

## 2024-02-29 NOTE — ED ADULT NURSE NOTE - OBJECTIVE STATEMENT
patient rec'd to  area in yellow gown with belongings separte from patient.  Pt only speaks Turkish.  Marcos, , present for interview.  Pt with goof eye contact.

## 2024-02-29 NOTE — ED PROVIDER NOTE - PATIENT PORTAL LINK FT
You can access the FollowMyHealth Patient Portal offered by [facility] by registering at the following website: [email or web address]. By joining [employer]’s FollowMyHealth portal, you will also be able to view your health information using other applications (apps) compatible with our system. You can access the FollowMyHealth Patient Portal offered by [facility] by registering at the following website: [email or web address]. By joining [employer]’s FollowMyHealth portal, you will also be able to view your health information using other applications (apps) compatible with our system.

## 2024-02-29 NOTE — ED PROVIDER NOTE - OBJECTIVE STATEMENT
54 yo male with PMH significant for HTN, HLD, GERD presents with complaints of " my head is not right" and "my memory is off." Patient providing non-specific symptoms which has been going on for several weeks. However, patient mentioned to nursing staff that he sometimes feels there are demons talking to him and telling him to do bad stuff.

## 2024-02-29 NOTE — ED ADULT TRIAGE NOTE - CHIEF COMPLAINT QUOTE
pt biba with "a lot of problems in my mind."  pt reports "demons attacking his head". pt reports "demons" only when he's trying to rest and they tell him to hurt himself.  pt currently denies si/ hi.  denies etoh use.

## 2024-02-29 NOTE — ED BEHAVIORAL HEALTH ASSESSMENT NOTE - SUMMARY
Pt is 52 y/o M with PMHx of gastritis and HLD presenting to the ED by ambulance because his "head is not right". Pt states that he is receiving messages and describes them as sounds and whispers but denies being told to harm himself or others. Pt reports receiving something that was gifted to him and that he thinks the food that was included in this gift has made him start to hear these messages. Pt is 54 y/o M with PMHx of gastritis and HLD presenting to the ED by ambulance because his "head is not right". Pt states that he is receiving messages and describes them as sounds and whispers but denies being told to harm himself or others. Pt attributes his confusion and hearing voices to one of his medications. Pt states that he called the ambulance for himself but that he does not remember why he called because he endorses "feeling fine" today.

## 2024-02-29 NOTE — ED ADULT NURSE NOTE - HPI (INCLUDE ILLNESS QUALITY, SEVERITY, DURATION, TIMING, CONTEXT, MODIFYING FACTORS, ASSOCIATED SIGNS AND SYMPTOMS)
Pt rec'd to  unit. Pt and belongings wand for contraband.  Pt states via  that in his head he has demons telling him bad thing.  when ask for example he said that tell him diabolical things.  Pt states that this has been going on for about 1 week.  Pt states having high cholesterol and states that he takes omperazole.  Pt states that his PMD is Melody Vargus.  Pt denies any out-pt or in-pt services.  Pt states that  the demons bring up bad things from the past.  Pt states that he presently lives alone in a rooming house and he is presently  from wife and states that he has a 26 y/o daughter.  Pt states that he presently is not working.  Pt denies any smoking, ETOH o rec drug use.  Pt states that his sleeping has been difficult for about 1 month since the demons started but states that his eating is good.  Pt orientated to unit and urine sample obtained and sent to lab

## 2024-02-29 NOTE — ED BEHAVIORAL HEALTH ASSESSMENT NOTE - DESCRIPTION
Gastritis, HLD Lives alone in home, unemployed, receives income from [employer] Pt brought to ED and told EMS/ hospital staff that he was hearing demons telling him to harm himself.

## 2024-02-29 NOTE — ED ADULT NURSE NOTE - NSFALLHARMRISKINTERV_ED_ALL_ED

## 2024-02-29 NOTE — ED PROVIDER NOTE - NSFOLLOWUPINSTRUCTIONS_ED_ALL_ED_FT
You were seen and evaluated in the emergency department for your symptoms..  Please follow your primary care doctor within 2 days to discuss your visit here today.    Please return to the emergency department for any new or concerning symptoms including but not limited to fever, chills, difficulty breathing, chest pain, abdominal pain, thoughts of hurting yourself or others.    If you are feeling suicidal, homicidal, depressed, feel that you are hallucinating, or need to talk to someone about your mental health, CALL [facility] -[phone] Fue atendido y evaluado en el departamento de emergencias por jorgito síntomas. Siga a francisco médico de atención primaria dentro de los 2 días siguientes para hablar sobre francisco visita aquí hoy.    Regrese al departamento de emergencias si presenta cualquier síntoma nuevo o preocupante, incluidos, entre otros, fiebre, escalofríos, dificultad para respirar, dolor en el pecho, dolor abdominal, pensamientos de lastimarse a sí mismo o a otros.    Si se siente suicida, homicida, deprimido, siente que está alucinando o necesita hablar con alguien sobre francisco courtney mental, [facility] CR[facility] -[phone]

## 2024-02-29 NOTE — ED BEHAVIORAL HEALTH ASSESSMENT NOTE - NSBHATTESTCOMMENTATTENDFT_PSY_A_CORE
lives alone Describes acute confusion due to "mix up with medications" presently calm relevant polite No reported psych or substance use history   cleared for discharge

## 2024-02-29 NOTE — ED BEHAVIORAL HEALTH ASSESSMENT NOTE - RISK ASSESSMENT
low risk No current psychosis no h/o psych illness or substance use Currently A & O X 3 polite and calm

## 2024-02-29 NOTE — ED PROVIDER NOTE - PROGRESS NOTE DETAILS
Dr. Pacheco: Progress note 1140: Patient evaluated by psychiatry team and recommending outpatient follow-up at this time.  No current medical therapy or inpatient psychiatric treatment required as per recommendations. Dr. Pacheco: Progress note 1249: Received phone call from social work stating patient still appears confused, case discussed with psychiatry for reevaluation. Dr. Pacheco: Pt asymptomatic at this time. Pt alert and oriented to baseline mental status. Denies current SI, stating will take a taxi home and able to recite address. Pt psychiatrically and medically cleared at this time for discharge.

## 2024-02-29 NOTE — ED ADULT NURSE REASSESSMENT NOTE - NS ED NURSE REASSESS COMMENT FT1
Patient seen by SW to prepare pt for d/c and return home. However, pt confused and unable to remember where he lived. Pt approached and staff urged pt to remove clothing and change back into gowns, but pt did not respond to staff. Pt remained nonverbal and standing stationary in room despite multiple prompts with (s) (Don and Raoul) present to assist. Pt b/p and HR elevated. MD made aware, and deemed pt needed further evaluation in main ED. Pt needed multiple prompts and firm encouragement, as well as education, to get him to comply with transfer to main ED. Pt assigned to room B-10 L.

## 2024-02-29 NOTE — ED BEHAVIORAL HEALTH ASSESSMENT NOTE - HPI (INCLUDE ILLNESS QUALITY, SEVERITY, DURATION, TIMING, CONTEXT, MODIFYING FACTORS, ASSOCIATED SIGNS AND SYMPTOMS)
Pt is 52 y/o M with PMHx of gastritis and HLD presenting to the ED by ambulance because his "head is not right". Pt states that he is receiving messages and describes them as sounds and whispers but denies being told to harm himself or others. Pt reports receiving something that was gifted to him and that he thinks the food that was included in this gift has made him start to hear these messages. Pt states that he called the ambulance for himself but that he does not remember why he called because he endorses "feeling fine" today. Pt denies wanting to harm himself or others, a depressed or anxious mood, and SI. Pt is 52 y/o M with PMHx of gastritis and HLD presenting to the ED by ambulance because his "head is not right". Pt states that he is receiving messages and describes them as sounds and whispers but denies being told to harm himself or others. Pt attributes his confusion and hearing voices to one of his medications. Pt states that he called the ambulance for himself but that he does not remember why he called because he endorses "feeling fine" today. Pt denies wanting to harm himself or others, a depressed or anxious mood, and SI. As of this morning, he denies any distressing symptoms. Pt is 52 y/o M with PMHx of gastritis and HLD presenting to the ED by ambulance because his "head is not right". Pt states that he is receiving messages and describes them as sounds and whispers but denies being told to harm himself or others. Pt attributes his confusion and hearing voices to one of his medications. Pt states that he called the ambulance for himself but that he does not remember why he called because he endorses "feeling fine" today. Pt denies wanting to harm himself or others, a depressed or anxious mood, and SI. As of this morning, he denies any distressing symptoms. Collateral as per brother and sister-in-law state that they talked to him 2 weeks ago and that he was not expressing any abnormal behavior. They report that he does not have any psychiatric issues and has never had anything like this occur before. Sister-in-law reports that he has never mentioned any issues or side effects from his medications.

## 2024-03-28 ENCOUNTER — APPOINTMENT (OUTPATIENT)
Dept: ORTHOPEDIC SURGERY | Facility: CLINIC | Age: 54
End: 2024-03-28

## 2024-11-01 NOTE — ED ADULT TRIAGE NOTE - ESI TRIAGE ACUITY LEVEL, MLM
Lovenox for DVT prophylaxis.    Mild hyponatremia today- asymptomatic- trial of fluids. Lovenox for DVT prophylaxis.   LED 10/25 neg for DVT     LBM 10/31    Mild hyponatremia- resolved w hydration.     Echo done today- f/u results.     D/c planning. Lovenox for DVT prophylaxis. 3